# Patient Record
Sex: MALE | Race: ASIAN | Employment: FULL TIME | URBAN - METROPOLITAN AREA
[De-identification: names, ages, dates, MRNs, and addresses within clinical notes are randomized per-mention and may not be internally consistent; named-entity substitution may affect disease eponyms.]

---

## 2018-05-24 PROCEDURE — 80061 LIPID PANEL: CPT | Performed by: PHYSICIAN ASSISTANT

## 2019-07-22 PROCEDURE — 84403 ASSAY OF TOTAL TESTOSTERONE: CPT | Performed by: UROLOGY

## 2019-07-22 PROCEDURE — 84402 ASSAY OF FREE TESTOSTERONE: CPT | Performed by: UROLOGY

## 2021-12-09 PROBLEM — R09.A2 GLOBUS PHARYNGEUS: Status: ACTIVE | Noted: 2021-12-09

## 2021-12-09 PROBLEM — K21.9 LARYNGOPHARYNGEAL REFLUX (LPR): Status: ACTIVE | Noted: 2021-12-09

## 2021-12-09 PROBLEM — R19.8 GLOBUS PHARYNGEUS: Status: ACTIVE | Noted: 2021-12-09

## 2024-05-02 ENCOUNTER — OFFICE VISIT (OUTPATIENT)
Dept: INTERNAL MEDICINE CLINIC | Facility: CLINIC | Age: 59
End: 2024-05-02
Payer: COMMERCIAL

## 2024-05-02 VITALS
DIASTOLIC BLOOD PRESSURE: 70 MMHG | TEMPERATURE: 97 F | BODY MASS INDEX: 26.06 KG/M2 | HEART RATE: 71 BPM | HEIGHT: 67 IN | SYSTOLIC BLOOD PRESSURE: 132 MMHG | RESPIRATION RATE: 18 BRPM | WEIGHT: 166 LBS | OXYGEN SATURATION: 96 %

## 2024-05-02 DIAGNOSIS — Z13.29 SCREENING FOR THYROID DISORDER: ICD-10-CM

## 2024-05-02 DIAGNOSIS — Z12.5 SCREENING FOR MALIGNANT NEOPLASM OF PROSTATE: ICD-10-CM

## 2024-05-02 DIAGNOSIS — E55.9 VITAMIN D DEFICIENCY: ICD-10-CM

## 2024-05-02 DIAGNOSIS — B35.1 ONYCHOMYCOSIS: ICD-10-CM

## 2024-05-02 DIAGNOSIS — Z13.1 SCREENING FOR DIABETES MELLITUS (DM): Primary | ICD-10-CM

## 2024-05-02 DIAGNOSIS — Z13.0 SCREENING, ANEMIA, DEFICIENCY, IRON: ICD-10-CM

## 2024-05-02 DIAGNOSIS — G47.00 INSOMNIA, UNSPECIFIED TYPE: ICD-10-CM

## 2024-05-02 DIAGNOSIS — Z13.220 SCREENING, LIPID: ICD-10-CM

## 2024-05-02 DIAGNOSIS — N52.9 ERECTILE DYSFUNCTION, UNSPECIFIED ERECTILE DYSFUNCTION TYPE: ICD-10-CM

## 2024-05-02 PROCEDURE — 99204 OFFICE O/P NEW MOD 45 MIN: CPT | Performed by: PHYSICIAN ASSISTANT

## 2024-05-02 RX ORDER — FLUTICASONE PROPIONATE 50 MCG
2 SPRAY, SUSPENSION (ML) NASAL DAILY
COMMUNITY
Start: 2023-07-23 | End: 2024-05-02

## 2024-05-02 RX ORDER — CICLOPIROX 80 MG/ML
SOLUTION TOPICAL
Qty: 6.6 ML | Refills: 3 | Status: SHIPPED | OUTPATIENT
Start: 2024-05-02

## 2024-05-02 RX ORDER — MELOXICAM 15 MG/1
15 TABLET ORAL DAILY
COMMUNITY
Start: 2023-02-16 | End: 2024-05-02

## 2024-05-02 RX ORDER — OMEPRAZOLE 20 MG/1
20 CAPSULE, DELAYED RELEASE ORAL EVERY MORNING
COMMUNITY
Start: 2023-07-19 | End: 2024-05-02

## 2024-05-02 RX ORDER — TRAZODONE HYDROCHLORIDE 50 MG/1
50 TABLET ORAL NIGHTLY
Qty: 30 TABLET | Refills: 1 | Status: SHIPPED | OUTPATIENT
Start: 2024-05-02

## 2024-05-02 RX ORDER — TADALAFIL 10 MG/1
10 TABLET ORAL
Qty: 27 TABLET | Refills: 1 | Status: SHIPPED | OUTPATIENT
Start: 2024-05-02

## 2024-05-02 NOTE — PROGRESS NOTES
Shyla Mcgraw is a 58 year old male.   Chief Complaint   Patient presents with    Establish Care     EJ RM 15- Re establishing care     HPI:    Pt presents to establish care.     ED. Needs refill of cialis. He requests 10 mg prn. Denies side effects.     Toenail fungus. Chronic issue. He has used penlac in the past and would like to try this again.     Insomnia. Trouble falling asleep. He is using otc sleep aids with some relief but inconsistent.     Allergies:  No Known Allergies   Current Meds:  Current Outpatient Medications   Medication Sig Dispense Refill    Tadalafil (CIALIS) 10 MG Oral Tab Take 1 tablet (10 mg total) by mouth daily as needed for Erectile Dysfunction. 27 tablet 1    traZODone 50 MG Oral Tab Take 1 tablet (50 mg total) by mouth nightly. 30 tablet 1    Ciclopirox (PENLAC) 8 % External Solution Apply nightly to affected nails and cuticles, remove excess q 7 d with alcohol swab 6.6 mL 3        PMH:   No past medical history on file.    ROS:   GENERAL: Negative for fever, chills and fatigue. NAD.  HENT: Negative for congestion, sore throat, and ear pain.  RESPIRATORY: Negative for cough, chest tightness, shortness of breath and wheezing.    CV: Negative for chest pain, palpitations and leg swelling.   GI: Negative for nausea, vomiting, abdominal pain, diarrhea, and blood in stool.   : Negative for dysuria, hematuria and difficulty urinating.   MUSCULOSKELETAL: Negative for myalgias, back pain, joint swelling, arthralgias and gait problem.   NEURO: Negative for dizziness, syncope, weakness, numbness, tingling and headaches.   PSYCH: The patient is not nervous/anxious. No depression.      PHYSICAL EXAM:    /70   Pulse 71   Temp 97.4 °F (36.3 °C) (Temporal)   Resp 18   Ht 5' 7\" (1.702 m)   Wt 166 lb (75.3 kg)   SpO2 96%   BMI 26.00 kg/m²     GENERAL: NAD. A&Ox3  RESPIRATORY: CTAB, no R/R/W  CV: RRR, no murmurs.   PSYCH: Appropriate mood and affect.      ASSESSMENT/ PLAN:   1.  Onychomycosis  Penlac refilled   - Ciclopirox (PENLAC) 8 % External Solution; Apply nightly to affected nails and cuticles, remove excess q 7 d with alcohol swab  Dispense: 6.6 mL; Refill: 3    2. Screening for diabetes mellitus (DM)  - Comp Metabolic Panel (14) [E]; Future    3. Screening, lipid  - Lipid Panel [E]; Future    4. Screening, anemia, deficiency, iron  - CBC W Differential W Platelet [E]; Future    5. Screening for thyroid disorder  - TSH W Reflex To Free T4 [E]; Future    6. Screening for malignant neoplasm of prostate  - PSA, Total (Screening) [E]; Future    7. Erectile dysfunction, unspecified erectile dysfunction type  Cialis refilled - reviewed possible side effects     8. Insomnia, unspecified type  Trial of trazodone nightly prn - do not take with other sedating meds   Work on sleep hygiene     9. Vitamin D deficiency  - Vitamin D [E]; Future       Health Maintenance Due   Topic Date Due    Colorectal Cancer Screening  Never done    DTaP,Tdap,and Td Vaccines (1 - Tdap) Never done    Zoster Vaccines (1 of 2) Never done    Annual Physical  07/10/2022    PSA  07/12/2023    COVID-19 Vaccine (3 - 2023-24 season) 09/01/2023    Annual Depression Screening  01/01/2024     *discussed Tdap and Shingrix, declined both today but will consider at [facility].       Pt indicates understanding and agrees to the plan.     Return in about 1 month (around 6/2/2024) for physical.    Hyacinth Urbano PA-C

## 2024-05-16 ENCOUNTER — TELEPHONE (OUTPATIENT)
Dept: INTERNAL MEDICINE CLINIC | Facility: CLINIC | Age: 59
End: 2024-05-16

## 2024-05-23 NOTE — TELEPHONE ENCOUNTER
Received paperwork for denial of Ciclopirox 8% solution from [employer].    Tracy Urbano, do you want to appeal this decision, order an alternative or Good RX?

## 2025-01-22 ENCOUNTER — OFFICE VISIT (OUTPATIENT)
Dept: INTERNAL MEDICINE CLINIC | Facility: CLINIC | Age: 60
End: 2025-01-22
Payer: COMMERCIAL

## 2025-01-22 VITALS
BODY MASS INDEX: 26 KG/M2 | WEIGHT: 165.63 LBS | SYSTOLIC BLOOD PRESSURE: 112 MMHG | TEMPERATURE: 97 F | HEART RATE: 91 BPM | DIASTOLIC BLOOD PRESSURE: 64 MMHG | OXYGEN SATURATION: 98 % | RESPIRATION RATE: 18 BRPM | HEIGHT: 67 IN

## 2025-01-22 DIAGNOSIS — G47.00 INSOMNIA, UNSPECIFIED TYPE: ICD-10-CM

## 2025-01-22 DIAGNOSIS — B35.1 ONYCHOMYCOSIS: ICD-10-CM

## 2025-01-22 DIAGNOSIS — R05.1 ACUTE COUGH: Primary | ICD-10-CM

## 2025-01-22 DIAGNOSIS — Z12.5 SCREENING FOR MALIGNANT NEOPLASM OF PROSTATE: ICD-10-CM

## 2025-01-22 DIAGNOSIS — E55.9 VITAMIN D DEFICIENCY: ICD-10-CM

## 2025-01-22 DIAGNOSIS — Z13.1 SCREENING FOR DIABETES MELLITUS (DM): ICD-10-CM

## 2025-01-22 DIAGNOSIS — Z13.29 SCREENING FOR THYROID DISORDER: ICD-10-CM

## 2025-01-22 DIAGNOSIS — E78.1 HYPERTRIGLYCERIDEMIA: ICD-10-CM

## 2025-01-22 DIAGNOSIS — Z13.0 SCREENING, ANEMIA, DEFICIENCY, IRON: ICD-10-CM

## 2025-01-22 PROCEDURE — 99213 OFFICE O/P EST LOW 20 MIN: CPT | Performed by: PHYSICIAN ASSISTANT

## 2025-01-22 RX ORDER — CICLOPIROX 80 MG/ML
SOLUTION TOPICAL
Qty: 6.6 ML | Refills: 3 | Status: SHIPPED | OUTPATIENT
Start: 2025-01-22

## 2025-01-22 RX ORDER — TADALAFIL 10 MG/1
10 TABLET ORAL
Qty: 27 TABLET | Refills: 1 | Status: SHIPPED | OUTPATIENT
Start: 2025-01-22

## 2025-01-22 RX ORDER — TRAZODONE HYDROCHLORIDE 50 MG/1
50 TABLET, FILM COATED ORAL NIGHTLY
Qty: 90 TABLET | Refills: 1 | Status: SHIPPED | OUTPATIENT
Start: 2025-01-22

## 2025-01-22 NOTE — PROGRESS NOTES
Shyla Mcgraw is a 59 year old male.   Chief Complaint   Patient presents with    Cough     EJ Rm 9- Pt is here for cough since Jan 1st     HPI:    Pt presents with the following:    C/o cough with yellow sputum and hoarse voice x 3 weeks.   Denies SOB. Denies fever. Denies sore throat.   Has not tried any otc meds.    Would like physical labs.     Allergies:  Allergies[1]   Current Meds:  Current Outpatient Medications   Medication Sig Dispense Refill    traZODone 50 MG Oral Tab Take 1 tablet (50 mg total) by mouth nightly. 90 tablet 1    Ciclopirox (PENLAC) 8 % External Solution Apply nightly to affected nails and cuticles, remove excess q 7 d with alcohol swab 6.6 mL 3    Tadalafil (CIALIS) 10 MG Oral Tab Take 1 tablet (10 mg total) by mouth daily as needed for Erectile Dysfunction. 27 tablet 1        PMH:   No past medical history on file.    ROS:   Review of Systems   Constitutional:  Negative for chills, fatigue and fever.   HENT:  Positive for voice change. Negative for congestion, ear pain, sinus pressure, sinus pain and sore throat.    Respiratory:  Positive for cough. Negative for shortness of breath and wheezing.    Cardiovascular:  Negative for chest pain, palpitations and leg swelling.   Neurological:  Negative for dizziness, light-headedness and headaches.       PHYSICAL EXAM:    /64   Pulse 91   Temp 96.7 °F (35.9 °C) (Temporal)   Resp 18   Ht 5' 7\" (1.702 m)   Wt 165 lb 9.6 oz (75.1 kg)   SpO2 98%   BMI 25.94 kg/m²     GENERAL: NAD. A&Ox3  HEENT: Ear canals clear, TMs pearly gray bilaterally. Throat without erythema or exudates.  NECK: No LAD.   RESPIRATORY: CTAB, no R/R/W  CV: RRR, no murmurs.   PSYCH: Appropriate mood and affect.      ASSESSMENT/ PLAN:   1. Acute cough  Mucinex DM prn   Cool mist humidifier in bedroom  F/u if persists or worsens     2. Screening, anemia, deficiency, iron  - CBC With Differential With Platelet; Future    3. Screening for diabetes mellitus (DM)  - Comp  Metabolic Panel (14); Future    4. Screening for thyroid disorder  - TSH W Reflex To Free T4; Future    5. Screening for malignant neoplasm of prostate  - PSA Total, Screen [Male over 50]; Future    6. Vitamin D deficiency  - Vitamin D [E]; Future    7. Onychomycosis  Stable   Med refilled   - Ciclopirox (PENLAC) 8 % External Solution; Apply nightly to affected nails and cuticles, remove excess q 7 d with alcohol swab  Dispense: 6.6 mL; Refill: 3    8. Hypertriglyceridemia  - Lipid Panel; Future    9. Insomnia, unspecified type  Stable   Med refilled   - traZODone 50 MG Oral Tab; Take 1 tablet (50 mg total) by mouth nightly.  Dispense: 90 tablet; Refill: 1       Health Maintenance Due   Topic Date Due    DTaP,Tdap,and Td Vaccines (1 - Tdap) Never done    Pneumococcal Vaccine: 50+ Years (1 of 1 - PCV) Never done    Zoster Vaccines (1 of 2) Never done    Annual Physical  07/10/2022    PSA  07/12/2023    COVID-19 Vaccine (3 - 2024-25 season) 09/01/2024    Influenza Vaccine (1) 10/01/2024           Pt indicates understanding and agrees to the plan.     Return for physical.    Hyacinth Urbano PA-C         [1] No Known Allergies

## 2025-02-03 ENCOUNTER — LAB ENCOUNTER (OUTPATIENT)
Dept: LAB | Age: 60
End: 2025-02-03
Attending: PHYSICIAN ASSISTANT
Payer: COMMERCIAL

## 2025-02-03 DIAGNOSIS — E78.1 HYPERTRIGLYCERIDEMIA: ICD-10-CM

## 2025-02-03 DIAGNOSIS — Z13.220 SCREENING, LIPID: ICD-10-CM

## 2025-02-03 DIAGNOSIS — Z13.29 SCREENING FOR THYROID DISORDER: ICD-10-CM

## 2025-02-03 DIAGNOSIS — E55.9 VITAMIN D DEFICIENCY: ICD-10-CM

## 2025-02-03 DIAGNOSIS — Z13.1 SCREENING FOR DIABETES MELLITUS (DM): ICD-10-CM

## 2025-02-03 DIAGNOSIS — Z13.0 SCREENING, ANEMIA, DEFICIENCY, IRON: ICD-10-CM

## 2025-02-03 DIAGNOSIS — Z12.5 SCREENING FOR MALIGNANT NEOPLASM OF PROSTATE: ICD-10-CM

## 2025-02-03 LAB
ALBUMIN SERPL-MCNC: 4.8 G/DL (ref 3.2–4.8)
ALBUMIN/GLOB SERPL: 1.5 {RATIO} (ref 1–2)
ALP LIVER SERPL-CCNC: 59 U/L
ALT SERPL-CCNC: 75 U/L
ANION GAP SERPL CALC-SCNC: 10 MMOL/L (ref 0–18)
AST SERPL-CCNC: 45 U/L (ref ?–34)
BASOPHILS # BLD AUTO: 0.07 X10(3) UL (ref 0–0.2)
BASOPHILS NFR BLD AUTO: 1.2 %
BILIRUB SERPL-MCNC: 1 MG/DL (ref 0.2–1.1)
BUN BLD-MCNC: 20 MG/DL (ref 9–23)
CALCIUM BLD-MCNC: 10 MG/DL (ref 8.7–10.6)
CHLORIDE SERPL-SCNC: 105 MMOL/L (ref 98–112)
CHOLEST SERPL-MCNC: 228 MG/DL (ref ?–200)
CO2 SERPL-SCNC: 26 MMOL/L (ref 21–32)
COMPLEXED PSA SERPL-MCNC: 0.54 NG/ML (ref ?–4)
CREAT BLD-MCNC: 1.02 MG/DL
EGFRCR SERPLBLD CKD-EPI 2021: 84 ML/MIN/1.73M2 (ref 60–?)
EOSINOPHIL # BLD AUTO: 0.18 X10(3) UL (ref 0–0.7)
EOSINOPHIL NFR BLD AUTO: 3.2 %
ERYTHROCYTE [DISTWIDTH] IN BLOOD BY AUTOMATED COUNT: 13.3 %
FASTING PATIENT LIPID ANSWER: YES
FASTING STATUS PATIENT QL REPORTED: YES
GLOBULIN PLAS-MCNC: 3.2 G/DL (ref 2–3.5)
GLUCOSE BLD-MCNC: 100 MG/DL (ref 70–99)
HCT VFR BLD AUTO: 46.4 %
HDLC SERPL-MCNC: 49 MG/DL (ref 40–59)
HGB BLD-MCNC: 15.7 G/DL
IMM GRANULOCYTES # BLD AUTO: 0.02 X10(3) UL (ref 0–1)
IMM GRANULOCYTES NFR BLD: 0.4 %
LDLC SERPL CALC-MCNC: 139 MG/DL (ref ?–100)
LYMPHOCYTES # BLD AUTO: 1.92 X10(3) UL (ref 1–4)
LYMPHOCYTES NFR BLD AUTO: 33.7 %
MCH RBC QN AUTO: 30.2 PG (ref 26–34)
MCHC RBC AUTO-ENTMCNC: 33.8 G/DL (ref 31–37)
MCV RBC AUTO: 89.2 FL
MONOCYTES # BLD AUTO: 0.42 X10(3) UL (ref 0.1–1)
MONOCYTES NFR BLD AUTO: 7.4 %
NEUTROPHILS # BLD AUTO: 3.08 X10 (3) UL (ref 1.5–7.7)
NEUTROPHILS # BLD AUTO: 3.08 X10(3) UL (ref 1.5–7.7)
NEUTROPHILS NFR BLD AUTO: 54.1 %
NONHDLC SERPL-MCNC: 179 MG/DL (ref ?–130)
OSMOLALITY SERPL CALC.SUM OF ELEC: 295 MOSM/KG (ref 275–295)
PLATELET # BLD AUTO: 206 10(3)UL (ref 150–450)
POTASSIUM SERPL-SCNC: 4.1 MMOL/L (ref 3.5–5.1)
PROT SERPL-MCNC: 8 G/DL (ref 5.7–8.2)
RBC # BLD AUTO: 5.2 X10(6)UL
SODIUM SERPL-SCNC: 141 MMOL/L (ref 136–145)
TRIGL SERPL-MCNC: 223 MG/DL (ref 30–149)
TSI SER-ACNC: 2.1 UIU/ML (ref 0.55–4.78)
VIT D+METAB SERPL-MCNC: 14 NG/ML (ref 30–100)
VIT D+METAB SERPL-MCNC: 20.3 NG/ML (ref 30–100)
VLDLC SERPL CALC-MCNC: 42 MG/DL (ref 0–30)
WBC # BLD AUTO: 5.7 X10(3) UL (ref 4–11)

## 2025-02-03 PROCEDURE — 85025 COMPLETE CBC W/AUTO DIFF WBC: CPT

## 2025-02-03 PROCEDURE — 36415 COLL VENOUS BLD VENIPUNCTURE: CPT

## 2025-02-03 PROCEDURE — 80061 LIPID PANEL: CPT

## 2025-02-03 PROCEDURE — 84443 ASSAY THYROID STIM HORMONE: CPT

## 2025-02-03 PROCEDURE — 82306 VITAMIN D 25 HYDROXY: CPT

## 2025-02-03 PROCEDURE — 80053 COMPREHEN METABOLIC PANEL: CPT

## 2025-02-14 ENCOUNTER — OFFICE VISIT (OUTPATIENT)
Dept: INTERNAL MEDICINE CLINIC | Facility: CLINIC | Age: 60
End: 2025-02-14
Payer: COMMERCIAL

## 2025-02-14 VITALS
TEMPERATURE: 97 F | BODY MASS INDEX: 26.15 KG/M2 | SYSTOLIC BLOOD PRESSURE: 118 MMHG | DIASTOLIC BLOOD PRESSURE: 72 MMHG | OXYGEN SATURATION: 97 % | HEIGHT: 67 IN | WEIGHT: 166.63 LBS | RESPIRATION RATE: 18 BRPM | HEART RATE: 72 BPM

## 2025-02-14 DIAGNOSIS — Z00.00 ROUTINE GENERAL MEDICAL EXAMINATION AT A HEALTH CARE FACILITY: Primary | ICD-10-CM

## 2025-02-14 DIAGNOSIS — R79.89 ELEVATED LFTS: ICD-10-CM

## 2025-02-14 DIAGNOSIS — E55.9 VITAMIN D DEFICIENCY: ICD-10-CM

## 2025-02-14 DIAGNOSIS — E78.2 MIXED HYPERLIPIDEMIA: ICD-10-CM

## 2025-02-14 DIAGNOSIS — R73.01 IFG (IMPAIRED FASTING GLUCOSE): ICD-10-CM

## 2025-02-14 PROCEDURE — 99396 PREV VISIT EST AGE 40-64: CPT | Performed by: PHYSICIAN ASSISTANT

## 2025-02-14 RX ORDER — ERGOCALCIFEROL 1.25 MG/1
50000 CAPSULE, LIQUID FILLED ORAL WEEKLY
Qty: 12 CAPSULE | Refills: 0 | Status: SHIPPED | OUTPATIENT
Start: 2025-02-14 | End: 2025-05-15

## 2025-02-14 NOTE — PROGRESS NOTES
Shyla Mcgraw is a 59 year old male who presents for a complete physical exam.     HPI:   Pt complains of:    Vit D deficiency.   Not currently taking any vitamins.     High cholesterol.     IFG.     Wt Readings from Last 6 Encounters:   02/14/25 166 lb 9.6 oz (75.6 kg)   01/22/25 165 lb 9.6 oz (75.1 kg)   05/02/24 166 lb (75.3 kg)   12/11/21 153 lb (69.4 kg)   12/01/21 158 lb (71.7 kg)   11/18/21 165 lb (74.8 kg)       Cholesterol, Total (mg/dL)   Date Value   02/03/2025 228 (H)   05/24/2018 172   11/20/2015 223 (H)   12/07/2013 227 (H)     Cholesterol (mg/dL)   Date Value   07/12/2021 244.00 (H)   12/02/2016 218 (H)     HDL Cholesterol (mg/dL)   Date Value   02/03/2025 49   05/24/2018 45 (L)   11/20/2015 49   12/07/2013 46     Direct HDL (mg/dL)   Date Value   07/12/2021 54   12/02/2016 61 (H)     LDL Cholesterol Calc (mg/dL)   Date Value   11/20/2015 108 (H)   12/07/2013 125 (H)     LDL Cholesterol (mg/dL)   Date Value   02/03/2025 139 (H)   05/24/2018 89     Calculated LDL (mg/dL)   Date Value   07/12/2021 143 (H)   12/02/2016 118.0 (H)     Triglycerides (mg/dL)   Date Value   11/20/2015 330 (H)   12/07/2013 279 (H)     AST (SGOT) (IU/L)   Date Value   11/20/2015 29   12/07/2013 24     AST (U/L)   Date Value   02/03/2025 45 (H)   07/12/2021 25   05/24/2018 24   04/07/2017 26     ALT (SGPT) (IU/L)   Date Value   11/20/2015 40   12/07/2013 24     ALT (U/L)   Date Value   02/03/2025 75 (H)   07/12/2021 29   05/24/2018 40   04/07/2017 46      Current Outpatient Medications   Medication Sig Dispense Refill    ergocalciferol 1.25 MG (50179 UT) Oral Cap Take 1 capsule (50,000 Units total) by mouth once a week. 12 capsule 0    traZODone 50 MG Oral Tab Take 1 tablet (50 mg total) by mouth nightly. 90 tablet 1    Ciclopirox (PENLAC) 8 % External Solution Apply nightly to affected nails and cuticles, remove excess q 7 d with alcohol swab 6.6 mL 3    Tadalafil (CIALIS) 10 MG Oral Tab Take 1 tablet (10 mg total) by mouth daily  as needed for Erectile Dysfunction. 27 tablet 1      No past medical history on file.   Past Surgical History:   Procedure Laterality Date    Knee surgery  2003    right acl      Family History   Problem Relation Age of Onset    Diabetes Mother     High Blood Pressure Mother     Cancer Mother            Social History     Socioeconomic History    Marital status:     Number of children: 1   Occupational History    Occupation: finance     Employer: [employer]   Tobacco Use    Smoking status: Never    Smokeless tobacco: Never   Vaping Use    Vaping status: Never Used   Substance and Sexual Activity    Alcohol use: No     Alcohol/week: 0.0 standard drinks of alcohol     Comment: occasional    Drug use: No    Sexual activity: Yes     Partners: Male       Health Maintenance Due   Topic Date Due    DTaP,Tdap,and Td Vaccines (1 - Tdap) Never done    Pneumococcal Vaccine: 50+ Years (1 of 1 - PCV) Never done    Zoster Vaccines (1 of 2) Never done    Annual Physical  07/10/2022    COVID-19 Vaccine (3 - 2024-25 season) 09/01/2024    Influenza Vaccine (1) 10/01/2024           REVIEW OF SYSTEMS:   GENERAL: feels well otherwise  SKIN: denies any unusual skin lesions  EYES:denies blurred vision or double vision  HEENT: denies nasal congestion, sinus pain or ST  LUNGS: denies shortness of breath with exertion  CARDIOVASCULAR: denies chest pain on exertion  GI: denies abdominal pain,denies heartburn  : denies nocturia or changes in stream  MUSCULOSKELETAL: denies back pain  NEURO: denies headaches  PSYCHE: denies depression or anxiety  HEMATOLOGIC: denies hx of anemia  ENDOCRINE: denies thyroid history  ALL/ASTHMA: denies hx of allergy or asthma    EXAM:   /72   Pulse 72   Temp 96.7 °F (35.9 °C) (Temporal)   Resp 18   Ht 5' 7\" (1.702 m)   Wt 166 lb 9.6 oz (75.6 kg)   SpO2 97%   BMI 26.09 kg/m²   Body mass index is 26.09 kg/m².   GENERAL: well developed, well nourished,in no apparent distress  SKIN: no  rashes,no suspicious lesions  HEENT: atraumatic, normocephalic,ears and throat are clear  EYES:PERRLA, EOMI, conjunctiva are clear  NECK: supple,no adenopathy  CHEST: no chest tenderness  LUNGS: clear to auscultation  CARDIO: RRR without murmur  GI: good BS's,no masses, HSM or tenderness  MUSCULOSKELETAL: back is not tender,FROM of the back  EXTREMITIES: no cyanosis, clubbing or edema  NEURO: Oriented times three,cranial nerves are intact,motor and sensory are grossly intact    ASSESSMENT AND PLAN:   Shyla Mcgraw is a 59 year old male who presents for a complete physical exam.    1. Routine general medical examination at a health care facility  Labs reviewed with pt   Discussed flu vaccine, tdap, pcv20, and shingrix - pt refused all vaccines today   Colonoscopy due 2027     2. Vitamin D deficiency  Rx vit D once weekly for 3 months - recheck in 3 months   - Vitamin D [E]; Future    3. Mixed hyperlipidemia  Reviewed lifestyle changes   Recheck in 3 months   - Lipid Panel [E]; Future    4. IFG (impaired fasting glucose)  Reviewed lifestyle changes   Recheck in 3 months   - Hemoglobin A1C [E]; Future  - Comp Metabolic Panel (14) [E]; Future    5. Elevated LFTs  Avoid tylenol and etoh   Recheck in 3 months   If remains elevated will check US  - Comp Metabolic Panel (14) [E]; Future      Orders Placed This Encounter   Procedures    Vitamin D [E]    Lipid Panel [E]    Hemoglobin A1C [E]    Comp Metabolic Panel (14) [E]       Meds and Refills:  Requested Prescriptions     Signed Prescriptions Disp Refills    ergocalciferol 1.25 MG (02156 UT) Oral Cap 12 capsule 0     Sig: Take 1 capsule (50,000 Units total) by mouth once a week.       Imaging and Referrals:  None            Hyacinth Urbano PA-C  ID#[id number]